# Patient Record
Sex: FEMALE | Race: WHITE | Employment: OTHER | URBAN - METROPOLITAN AREA
[De-identification: names, ages, dates, MRNs, and addresses within clinical notes are randomized per-mention and may not be internally consistent; named-entity substitution may affect disease eponyms.]

---

## 2018-07-25 ENCOUNTER — RADIANT APPOINTMENT (OUTPATIENT)
Dept: GENERAL RADIOLOGY | Facility: CLINIC | Age: 38
End: 2018-07-25
Attending: ORTHOPAEDIC SURGERY
Payer: COMMERCIAL

## 2018-07-25 ENCOUNTER — OFFICE VISIT (OUTPATIENT)
Dept: ORTHOPEDICS | Facility: CLINIC | Age: 38
End: 2018-07-25
Payer: COMMERCIAL

## 2018-07-25 VITALS
WEIGHT: 116 LBS | BODY MASS INDEX: 21.9 KG/M2 | HEIGHT: 61 IN | SYSTOLIC BLOOD PRESSURE: 118 MMHG | DIASTOLIC BLOOD PRESSURE: 76 MMHG

## 2018-07-25 DIAGNOSIS — M25.561 RIGHT KNEE PAIN, UNSPECIFIED CHRONICITY: ICD-10-CM

## 2018-07-25 DIAGNOSIS — M23.91 LOCKED KNEE, RIGHT: ICD-10-CM

## 2018-07-25 DIAGNOSIS — S83.241A TEAR OF MEDIAL MENISCUS OF RIGHT KNEE, CURRENT, UNSPECIFIED TEAR TYPE, INITIAL ENCOUNTER: Primary | ICD-10-CM

## 2018-07-25 DIAGNOSIS — M25.562 LEFT KNEE PAIN: ICD-10-CM

## 2018-07-25 DIAGNOSIS — M25.661 KNEE STIFFNESS, RIGHT: ICD-10-CM

## 2018-07-25 PROCEDURE — 73562 X-RAY EXAM OF KNEE 3: CPT | Mod: TC

## 2018-07-25 PROCEDURE — 99204 OFFICE O/P NEW MOD 45 MIN: CPT | Mod: 25 | Performed by: ORTHOPAEDIC SURGERY

## 2018-07-25 PROCEDURE — 20610 DRAIN/INJ JOINT/BURSA W/O US: CPT | Mod: RT | Performed by: ORTHOPAEDIC SURGERY

## 2018-07-25 RX ORDER — MELOXICAM 15 MG/1
15 TABLET ORAL DAILY
Qty: 60 TABLET | Refills: 1 | Status: SHIPPED | OUTPATIENT
Start: 2018-07-25 | End: 2021-09-23

## 2018-07-25 ASSESSMENT — PAIN SCALES - GENERAL: PAINLEVEL: SEVERE PAIN (6)
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
## 2018-07-25 NOTE — MR AVS SNAPSHOT
After Visit Summary   7/25/2018    Cristian Panda    MRN: [id number]           Patient Information     Date Of Birth          1980        Visit Information        Provider Department      7/25/2018 3:30 PM Miracle Mckeon MD [facility]        Today's Diagnoses     possible Tear of medial meniscus of right knee, current, unspecified tear type, initial encounter    -  1    Knee stiffness, right        Locked knee, right          Care Instructions    Encounter Diagnoses   Name Primary?     Knee stiffness, right      Locked knee, right      possible Tear of medial meniscus of right knee, current, unspecified tear type, initial encounter Yes     Rest, ice and elevate above heart level as needed for pain control  1. Xray: You have excellent looking x-rays.  No arthritis no fracture no tumor.  However x-rays do not show us the ligaments or tendons or soft tissue.  2.  On exam your knee is swollen and locked up.  You do not have much range of motion at all.  You are very tender on your medial side or inside part of your knee.  3. We have ordered an MRI for you.  Please call [phone] to schedule your MRI.  You will also need to schedule a follow up visit for the results from your MRI with Dr. Mckeon.  You may call us at [phone] to schedule this appointment.  Please make this appointment for at least  2-3 days after your MRI.   4.  The MRI will help tell us if you have a torn meniscus that is in the joint blocking your range of motion or may be a foreign body.  5.  Today we decided to do a few treatments to give you some relief until we see you back.  6. I ordered Mobic 15mg once a day times 2 weeks, then take as needed.  Stop this medication if you have any abdominal pain with it and do not take NSAIDs like Ibuprofen or Aleve while on this medication.  I sent this to your pharmacy.        7. We decided to do a cortisone injection today.  This is like putting very powerful  ibuprofen right to the area it is needed.  We have information about the injection below.   8. Follow up with Miracle Mckeon MD 2 to 3 days after the MRI is done to go over the results.   Cortisone Instructions:     1. You received an injection of cortisone into your right knee today.  2. The joint(s) may be more painful for the first 1-2 days.  3. We ask you to continue to rest the joint(s) for a few more days before resuming regular activities.  4. Pain Medications you can take (as long as your primary care provider allows these meds and you do not have kidney or liver conditions):  Tylenol  Take 1000 mg by mouth every 6 hours as needed; maximum dose 4000 mg a day  Ibuprofen  600 mg every 6 hours as needed; maximum 2400 mg a day  (OK to take tylenol and ibuprofen at the same time)  5. Rest, ice and elevate as needed for pain control  6. Watch for these signs of infection: redness, swelling, drainage, warmth to touch, increased pain, or fever. Call the clinic or make an appointment to be seen if you think you have an infection.  7. If you are diabetic, make sure you keep a close eye on your blood sugars, they can get elevated with cortisone injections.   8. Sometimes it can take 1-2 weeks for it to reach its full effect.    Cortisone Injections  Cortisone is a type of steroid. It can greatly reduce swelling, redness, and irritation (inflammation) and pain. Being injected with cortisone is simple and doesn t take long. Your doctor may ask you questions about your health. Certain health conditions, such as diabetes, can be affected by cortisone.     Your pain may be relieved by a cortisone injection.   Why have a cortisone injection?  Injecting cortisone can relieve pain for anything from a sports injury to arthritis. Your doctor may suggest an injection if rest, splints, or oral medicine doesn t relieve your pain. Injecting cortisone is simpler than having surgery. And cortisone may provide the lasting pain  relief that can help you get out and enjoy life again.  Getting the injection  Your doctor will start by cleaning and occasionally numbing your skin at the injection site. Next you ll be injected with local anesthetics (for short-term pain relief) and cortisone. The injection may last a few moments. A small bandage will be put over the injection site. You ll then be ready to go home.  After your injection  After being injected, make sure you don t injure the treated region. But stay active. Enjoy a walk or some other mild activity. Just be careful not to strain the region that gave you trouble.  The next day  Some people feel more pain after being injected. This is normal, and it will go away soon. Applying ice for 20 minutes at a time to your injury may reduce the increased pain. Rest for the first day or two. You don t need to stay in bed. But avoid tasks that may strain the injured region.  If you have diabetes  Cortisone injections can cause blood sugar to be increased for several days after the injection. If you have diabetes, you should follow your blood  sugar closely during this time. Follow your regular plan for what to do when your blood sugar is elevated.     0971-7306 [employer]. [address] [place] [address]. All rights reserved. This information is not intended as a substitute for professional medical care. Always follow your healthcare professional's instructions.     [employer] and [employer] may offer reliable information regarding your diagnosis and treatment plan.    THANK YOU for coming in today. If you receive a survey via [employer] or mail please let us know if there was anything you especially appreciated today or if there is any way we can improve our clinic. We appreciate your input.    GENERAL INFORMATION:  Our hours are:  Monday :     Clinic 7:30 AM-430 PM ([facility])  Tuesday:      Operating Room All Day ([facility])  Wednesday: Clinic 7:30 AM - 11:15 AM ([facility])             Clinic 1:00 PM - 4:00PM ([facility])  Thursday:     Administrative Day  Friday:          Clinic 7:30 AM - 11:15 AM ([facility])            Clinic 1:00 PM - 4:00 PM ([facility])      [facility] for any issues or concerns: [phone]      We are not in the office Thursdays. Therefore non- urgent calls and medical messages received on Thursday will be addressed when we are back in the office on Wednesday. Urgent matters will be reviewed and addressed by one of our partners in the office as needed.    If lab work was done today as part of your evaluation you will generally be contacted via [employer], mail, or phone with the results within 1-5 days. If there is an alarming result we will contact you by phone. Lab results come back at varying times, I generally wait until all labs are resulted before making comments on results. Please note labs are automatically released to [employer] (if you have signed up for it) once available-at times you may see these prior to my having a chance to review them as well.    If you need refills please contact your pharmacist. They will send a refill request to me to review. Please allow 3 business days for us to process all refill requests. All narcotic refills should be handled in the clinic at the time of your visit.           Follow-ups after your visit        Who to contact     If you have questions or need follow up information about today's clinic visit or your schedule please contact [facility] directly at [phone].  Normal or non-critical lab and imaging results will be communicated to you by MyChart, letter or phone within 4 business days after the clinic has received the results. If you do not hear from us within 7 days, please contact the clinic through [employer] or phone. If you have a critical or abnormal  "lab result, we will notify you by phone as soon as possible.  Submit refill requests through [employer] or call your pharmacy and they will forward the refill request to us. Please allow 3 business days for your refill to be completed.          Additional Information About Your Visit        [employer] Information     [employer] lets you send messages to your doctor, view your test results, renew your prescriptions, schedule appointments and more. To sign up, go to [email or web address] . Click on \"Log in\" on the left side of the screen, which will take you to the Welcome page. Then click on \"Sign up Now\" on the right side of the page.     You will be asked to enter the access code listed below, as well as some personal information. Please follow the directions to create your username and password.     Your access code is: [email or web address]  Expires: 10/23/2018  4:33 PM     Your access code will  in 90 days. If you need help or a new code, please call your [place] clinic or [phone].        Care EveryWhere ID     This is your Care EveryWhere ID. This could be used by other organizations to access your [place] medical records  [id number]        Your Vitals Were     Height BMI (Body Mass Index)                1.549 m (5' 1\") 21.92 kg/m2           Blood Pressure from Last 3 Encounters:   18 118/76   16 120/70   16 114/66    Weight from Last 3 Encounters:   18 52.6 kg (116 lb)   16 52.2 kg (115 lb)   16 52.2 kg (115 lb)              Today, you had the following     No orders found for display         Today's Medication Changes          These changes are accurate as of 18  4:33 PM.  If you have any questions, ask your nurse or doctor.               Start taking these medicines.        Dose/Directions    meloxicam 15 MG tablet   Commonly known as:  MOBIC   Used for:  Knee stiffness, right, Locked knee, right, Tear of medial meniscus of right knee, current, unspecified tear type, " initial encounter   Started by:  Miracle Mckeon MD        Dose:  15 mg   Take 1 tablet (15 mg) by mouth daily   Quantity:  60 tablet   Refills:  1            Where to get your medicines      These medications were sent to [facility] [place] - [address] [facility]   [address] [facility] , [facility] [address]     Phone:  [phone]     meloxicam 15 MG tablet                Primary Care Provider Office Phone # Fax #    [facility] [phone]       [address] [place] [address] [address]  [facility] [address]        Equal Access to Services     [facility]: Hadii aad ku hadasho Soomaali, waaxda luqadaha, qaybta kaalmada adeegyada, waxivet housein hayaan adenila logan . So [facility] [phone].    ATENCIÓN: Si habla español, tiene a jackson disposición servicios gratuitos de asistencia lingüística. [facility] [phone].    We comply with applicable federal civil rights laws and [place] laws. We do not discriminate on the basis of race, color, national origin, age, disability, sex, sexual orientation, or gender identity.            Thank you!     Thank you for choosing [facility]  for your care. Our goal is always to provide you with excellent care. Hearing back from our patients is one way we can continue to improve our services. Please take a few minutes to complete the written survey that you may receive in the mail after your visit with us. Thank you!             Your Updated Medication List - Protect others around you: Learn how to safely use, store and throw away your medicines at www.disposemymeds.org.          This list is accurate as of 7/25/18  4:33 PM.  Always use your most recent med list.                   Brand Name Dispense Instructions for use Diagnosis    levonorgestrel 20 MCG/24HR IUD    MIRENA     1 each (20 mcg) by Intrauterine route continuous    Encounter for IUD insertion, Encounter for IUD removal       meloxicam 15 MG tablet    MOBIC    60  tablet    Take 1 tablet (15 mg) by mouth daily    Knee stiffness, right, Locked knee, right, Tear of medial meniscus of right knee, current, unspecified tear type, initial encounter       neomycin-polymixin-dexamethasone ophthalmic ointment    MAXITROL    3.5 g    Instil 1/2 inch ribbon of ointment into affected eye every 4 hours for 7 to 10 days.    Redness or discharge of eye, Acute bacterial conjunctivitis of both eyes       priLOSEC 20 MG CR capsule   Generic drug:  omeprazole      1 CAPSULE DAILY

## 2018-07-25 NOTE — PATIENT INSTRUCTIONS
Encounter Diagnoses   Name Primary?     Knee stiffness, right      Locked knee, right      possible Tear of medial meniscus of right knee, current, unspecified tear type, initial encounter Yes     Rest, ice and elevate above heart level as needed for pain control  1. Xray: You have excellent looking x-rays.  No arthritis no fracture no tumor.  However x-rays do not show us the ligaments or tendons or soft tissue.  2.  On exam your knee is swollen and locked up.  You do not have much range of motion at all.  You are very tender on your medial side or inside part of your knee.  3. We have ordered an MRI for you.  Please call [phone] to schedule your MRI.  You will also need to schedule a follow up visit for the results from your MRI with Dr. Mckeon.  You may call us at [phone] to schedule this appointment.  Please make this appointment for at least  2-3 days after your MRI.   4.  The MRI will help tell us if you have a torn meniscus that is in the joint blocking your range of motion or may be a foreign body.  5.  Today we decided to do a few treatments to give you some relief until we see you back.  6. I ordered Mobic 15mg once a day times 2 weeks, then take as needed.  Stop this medication if you have any abdominal pain with it and do not take NSAIDs like Ibuprofen or Aleve while on this medication.  I sent this to your pharmacy.        7. We decided to do a cortisone injection today.  This is like putting very powerful ibuprofen right to the area it is needed.  We have information about the injection below.   8. Follow up with Miracle Mckeon MD 2 to 3 days after the MRI is done to go over the results.   Cortisone Instructions:     1. You received an injection of cortisone into your right knee today.  2. The joint(s) may be more painful for the first 1-2 days.  3. We ask you to continue to rest the joint(s) for a few more days before resuming regular activities.  4. Pain Medications you can take (as  long as your primary care provider allows these meds and you do not have kidney or liver conditions):  Tylenol  Take 1000 mg by mouth every 6 hours as needed; maximum dose 4000 mg a day  Ibuprofen  600 mg every 6 hours as needed; maximum 2400 mg a day  (OK to take tylenol and ibuprofen at the same time)  5. Rest, ice and elevate as needed for pain control  6. Watch for these signs of infection: redness, swelling, drainage, warmth to touch, increased pain, or fever. Call the clinic or make an appointment to be seen if you think you have an infection.  7. If you are diabetic, make sure you keep a close eye on your blood sugars, they can get elevated with cortisone injections.   8. Sometimes it can take 1-2 weeks for it to reach its full effect.    Cortisone Injections  Cortisone is a type of steroid. It can greatly reduce swelling, redness, and irritation (inflammation) and pain. Being injected with cortisone is simple and doesn t take long. Your doctor may ask you questions about your health. Certain health conditions, such as diabetes, can be affected by cortisone.     Your pain may be relieved by a cortisone injection.   Why have a cortisone injection?  Injecting cortisone can relieve pain for anything from a sports injury to arthritis. Your doctor may suggest an injection if rest, splints, or oral medicine doesn t relieve your pain. Injecting cortisone is simpler than having surgery. And cortisone may provide the lasting pain relief that can help you get out and enjoy life again.  Getting the injection  Your doctor will start by cleaning and occasionally numbing your skin at the injection site. Next you ll be injected with local anesthetics (for short-term pain relief) and cortisone. The injection may last a few moments. A small bandage will be put over the injection site. You ll then be ready to go home.  After your injection  After being injected, make sure you don t injure the treated region. But stay active.  Enjoy a walk or some other mild activity. Just be careful not to strain the region that gave you trouble.  The next day  Some people feel more pain after being injected. This is normal, and it will go away soon. Applying ice for 20 minutes at a time to your injury may reduce the increased pain. Rest for the first day or two. You don t need to stay in bed. But avoid tasks that may strain the injured region.  If you have diabetes  Cortisone injections can cause blood sugar to be increased for several days after the injection. If you have diabetes, you should follow your blood  sugar closely during this time. Follow your regular plan for what to do when your blood sugar is elevated.     0569-1030 The [employer]. [address] [place] [address], [place] [address]. All rights reserved. This information is not intended as a substitute for professional medical care. Always follow your healthcare professional's instructions.     [email or web address] and [employer] may offer reliable information regarding your diagnosis and treatment plan.    THANK YOU for coming in today. If you receive a survey via [employer] or mail please let us know if there was anything you especially appreciated today or if there is any way we can improve our clinic. We appreciate your input.    GENERAL INFORMATION:  Our hours are:  Monday :     Clinic 7:30 AM-430 PM ([facility])  Tuesday:      Operating Room All Day ([facility])  Wednesday: Clinic 7:30 AM - 11:15 AM ([facility])             Clinic 1:00 PM - 4:00PM ([facility])  Thursday:     Administrative Day  Friday:          Clinic 7:30 AM - 11:15 AM ([facility])            Clinic 1:00 PM - 4:00 PM ([facility])      [facility] for any issues or concerns: [phone]      We are not in the office Thursdays. Therefore non- urgent calls and medical messages received on Thursday will be  addressed when we are back in the office on Wednesday. Urgent matters will be reviewed and addressed by one of our partners in the office as needed.    If lab work was done today as part of your evaluation you will generally be contacted via [employer], mail, or phone with the results within 1-5 days. If there is an alarming result we will contact you by phone. Lab results come back at varying times, I generally wait until all labs are resulted before making comments on results. Please note labs are automatically released to [employer] (if you have signed up for it) once available-at times you may see these prior to my having a chance to review them as well.    If you need refills please contact your pharmacist. They will send a refill request to me to review. Please allow 3 business days for us to process all refill requests. All narcotic refills should be handled in the clinic at the time of your visit.

## 2018-07-26 ENCOUNTER — HOSPITAL ENCOUNTER (OUTPATIENT)
Dept: MRI IMAGING | Facility: CLINIC | Age: 38
Discharge: HOME | End: 2018-07-26
Attending: PHYSICIAN ASSISTANT | Admitting: PHYSICIAN ASSISTANT
Payer: COMMERCIAL

## 2018-07-26 DIAGNOSIS — M23.91 LOCKED KNEE, RIGHT: ICD-10-CM

## 2018-07-26 DIAGNOSIS — S83.241A TEAR OF MEDIAL MENISCUS OF RIGHT KNEE, CURRENT, UNSPECIFIED TEAR TYPE, INITIAL ENCOUNTER: ICD-10-CM

## 2018-07-26 PROCEDURE — 73721 MRI JNT OF LWR EXTRE W/O DYE: CPT | Mod: RT

## 2018-08-03 ENCOUNTER — TELEPHONE (OUTPATIENT)
Dept: ORTHOPEDICS | Facility: CLINIC | Age: 38
End: 2018-08-03

## 2018-08-08 ENCOUNTER — OFFICE VISIT (OUTPATIENT)
Dept: ORTHOPEDICS | Facility: CLINIC | Age: 38
End: 2018-08-08
Payer: COMMERCIAL

## 2018-08-08 VITALS
BODY MASS INDEX: 21.52 KG/M2 | HEIGHT: 61 IN | SYSTOLIC BLOOD PRESSURE: 120 MMHG | WEIGHT: 114 LBS | DIASTOLIC BLOOD PRESSURE: 72 MMHG

## 2018-08-08 DIAGNOSIS — M25.661 KNEE STIFFNESS, RIGHT: Primary | ICD-10-CM

## 2018-08-08 PROCEDURE — 99212 OFFICE O/P EST SF 10 MIN: CPT | Performed by: ORTHOPAEDIC SURGERY

## 2018-08-08 ASSESSMENT — PAIN SCALES - GENERAL: PAINLEVEL: NO PAIN (0)

## 2018-08-08 NOTE — PROGRESS NOTES
"Office Visit-Follow up      Chief Complaint: Cristian Panda is a 37 year old female who is being seen for   Chief Complaint   Patient presents with     Results     rt knee MRI results         History of Present Illness:   Injection helped a lot, feels 98% better      Past medical history reviewed and there is no significant change    Patient does use Tobacco products. Patient not ready to quit at this time.  Strongly encouraged smoking cessation.  Risks of smoking and benefits of quitting were discussed.  Information offered: AVS with information about stopping smoking and advised to discuss smoking cessation medications/strategies with Primary care provider.  3-5 Minutes were spent in counseling.    REVIEW OF SYSTEMS  General: negative for, night sweats, dizziness, fatigue  Resp: No shortness of breath and no cough  CV: negative for chest pain, syncope or near-syncope  GI: negative for nausea, vomiting and diarrhea  : negative for dysuria and hematuria  Musculoskeletal: as above  Neurologic: negative for syncope   Hematologic: negative for bleeding disorder      Physical Exam:  Vitals: /72  Ht 1.549 m (5' 1\")  Wt 51.7 kg (114 lb)  BMI 21.54 kg/m2  BMI= Body mass index is 21.54 kg/(m^2).  Constitutional: healthy, alert and no acute distress   Psychiatric: mentation appears normal and affect normal/bright  NEURO: no focal deficits  RESP: Normal with easy respirations and no use of accessory muscles to breathe, no audible wheezing or retractions  CV: No peripheral edema  SKIN: No erythema, rashes, excoriation, or breakdown. No evidence of infection.   JOINT/EXTREMITIES:right Knee Exam: Inspection: AP/lateral alignment normal, No effusion, No quad atrophy  Tender: none  Non-tender: lateral joint line, medial joint line  Active Range of Motion: all normal, much improved  Strength: normal  Special tests: normal Valgus stress test, normal Varus, negative Lachman's test    GAIT: non-antalgic      Diagnostic " Modalities:  right knee MRI:  medial meniscus tear. effusion. Both small.  Independent visualization of the images was performed.      Impression: right Medial meniscus tear  Effusion, stiffness, resolved    Plan:  All of the above pertinent physical exam and imaging modalities findings was reviewed with Cristian.                                          CONSERVATIVE CARE:  I recommend conservative care for the patient to include NSAIDs, Tylenol, focused self directed physical therapy, steroid injections, activity modifications. .    BP Readings from Last 1 Encounters:   08/08/18 120/72       BP noted to be well controlled today in office.     Return to clinic 1, month(s), PRN, or sooner as needed for changes.  Re-x-ray on return: No    Miracle Mckeon M.D.

## 2018-08-08 NOTE — MR AVS SNAPSHOT
"              After Visit Summary   2018    Cristian Panda    MRN: [id number]           Patient Information     Date Of Birth          1980        Visit Information        Provider Department      2018 3:20 PM Miracle Mckeon MD [facility]        Today's Diagnoses     Knee stiffness, right    -  1       Follow-ups after your visit        Who to contact     If you have questions or need follow up information about today's clinic visit or your schedule please contact [facility] directly at [phone].  Normal or non-critical lab and imaging results will be communicated to you by [employer], letter or phone within 4 business days after the clinic has received the results. If you do not hear from us within 7 days, please contact the clinic through [employer] or phone. If you have a critical or abnormal lab result, we will notify you by phone as soon as possible.  Submit refill requests through [employer] or call your pharmacy and they will forward the refill request to us. Please allow 3 business days for your refill to be completed.          Additional Information About Your Visit        MyChart Information     [employer] lets you send messages to your doctor, view your test results, renew your prescriptions, schedule appointments and more. To sign up, go to [email or web address] . Click on \"Log in\" on the left side of the screen, which will take you to the Welcome page. Then click on \"Sign up Now\" on the right side of the page.     You will be asked to enter the access code listed below, as well as some personal information. Please follow the directions to create your username and password.     Your access code is: [email or web address]  Expires: 10/23/2018  4:33 PM     Your access code will  in 90 days. If you need help or a new code, please call your [facility] or [phone].        Care EveryWhere ID     This is your Care EveryWhere ID. This could be used by other " "organizations to access your [place] medical records  [id number]        Your Vitals Were     Height BMI (Body Mass Index)                1.549 m (5' 1\") 21.54 kg/m2           Blood Pressure from Last 3 Encounters:   08/08/18 120/72   07/25/18 118/76   09/27/16 120/70    Weight from Last 3 Encounters:   08/08/18 51.7 kg (114 lb)   07/25/18 52.6 kg (116 lb)   09/27/16 52.2 kg (115 lb)              Today, you had the following     No orders found for display       Primary Care Provider Office Phone # Fax #    [place] [phone] [address] [facility] [address]        Equal Access to Services     HINA GODINEZ : Ty lion Sofrederic, waaxda luqadaha, qaybta kaalmada adeegyada, amilcar wooten. So [facility] [phone].    ATENCIÓN: Si habla español, tiene a jackson disposición servicios gratuitos de asistencia lingüística. Llame al [phone].    We comply with applicable federal civil rights laws and [place] laws. We do not discriminate on the basis of race, color, national origin, age, disability, sex, sexual orientation, or gender identity.            Thank you!     Thank you for choosing [facility]  for your care. Our goal is always to provide you with excellent care. Hearing back from our patients is one way we can continue to improve our services. Please take a few minutes to complete the written survey that you may receive in the mail after your visit with us. Thank you!             Your Updated Medication List - Protect others around you: Learn how to safely use, store and throw away your medicines at www.disposemymeds.org.          This list is accurate as of 8/8/18  4:56 PM.  Always use your most recent med list.                   Brand Name Dispense Instructions for use Diagnosis    levonorgestrel 20 MCG/24HR IUD    MIRENA     1 each (20 mcg) by Intrauterine route continuous    Encounter for IUD insertion, Encounter " for IUD removal       meloxicam 15 MG tablet    MOBIC    60 tablet    Take 1 tablet (15 mg) by mouth daily    Knee stiffness, right, Locked knee, right, Tear of medial meniscus of right knee, current, unspecified tear type, initial encounter       neomycin-polymixin-dexamethasone ophthalmic ointment    MAXITROL    3.5 g    Instil 1/2 inch ribbon of ointment into affected eye every 4 hours for 7 to 10 days.    Redness or discharge of eye, Acute bacterial conjunctivitis of both eyes       priLOSEC 20 MG CR capsule   Generic drug:  omeprazole      1 CAPSULE DAILY

## 2018-08-08 NOTE — LETTER
"    8/8/2018         RE: Cristian Panda  [address]  [facility] [address]        Dear Colleague,    Thank you for referring your patient, Cristian Panda, to the [facility]. Please see a copy of my visit note below.    Office Visit-Follow up      Chief Complaint: Cristian Panda is a 37 year old female who is being seen for   Chief Complaint   Patient presents with     Results     rt knee MRI results         History of Present Illness:   Injection helped a lot, feels 98% better      Past medical history reviewed and there is no significant change    Patient does use Tobacco products. Patient not ready to quit at this time.  Strongly encouraged smoking cessation.  Risks of smoking and benefits of quitting were discussed.  Information offered: AVS with information about stopping smoking and advised to discuss smoking cessation medications/strategies with Primary care provider.  3-5 Minutes were spent in counseling.    REVIEW OF SYSTEMS  General: negative for, night sweats, dizziness, fatigue  Resp: No shortness of breath and no cough  CV: negative for chest pain, syncope or near-syncope  GI: negative for nausea, vomiting and diarrhea  : negative for dysuria and hematuria  Musculoskeletal: as above  Neurologic: negative for syncope   Hematologic: negative for bleeding disorder      Physical Exam:  Vitals: /72  Ht 1.549 m (5' 1\")  Wt 51.7 kg (114 lb)  BMI 21.54 kg/m2  BMI= Body mass index is 21.54 kg/(m^2).  Constitutional: healthy, alert and no acute distress   Psychiatric: mentation appears normal and affect normal/bright  NEURO: no focal deficits  RESP: Normal with easy respirations and no use of accessory muscles to breathe, no audible wheezing or retractions  CV: No peripheral edema  SKIN: No erythema, rashes, excoriation, or breakdown. No evidence of infection.   JOINT/EXTREMITIES:right Knee Exam: Inspection: AP/lateral alignment normal, No effusion, No quad atrophy  Tender: " none  Non-tender: lateral joint line, medial joint line  Active Range of Motion: all normal, much improved  Strength: normal  Special tests: normal Valgus stress test, normal Varus, negative Lachman's test    GAIT: non-antalgic      Diagnostic Modalities:  right knee MRI:  medial meniscus tear. effusion. Both small.  Independent visualization of the images was performed.      Impression: right Medial meniscus tear  Effusion, stiffness, resolved    Plan:  All of the above pertinent physical exam and imaging modalities findings was reviewed with Cristian.                                          CONSERVATIVE CARE:  I recommend conservative care for the patient to include NSAIDs, Tylenol, focused self directed physical therapy, steroid injections, activity modifications. .    BP Readings from Last 1 Encounters:   08/08/18 120/72       BP noted to be well controlled today in office.     Return to clinic 1, month(s), PRN, or sooner as needed for changes.  Re-x-ray on return: No    Miracle Mckeon M.D.          Again, thank you for allowing me to participate in the care of your patient.        Sincerely,        Miracle Mckeon MD

## 2019-07-10 ENCOUNTER — HOSPITAL ENCOUNTER (EMERGENCY)
Facility: CLINIC | Age: 39
Discharge: HOME | End: 2019-07-11
Attending: FAMILY MEDICINE | Admitting: FAMILY MEDICINE
Payer: COMMERCIAL

## 2019-07-10 DIAGNOSIS — M54.5 ACUTE LEFT-SIDED LOW BACK PAIN, WITH SCIATICA PRESENCE UNSPECIFIED: ICD-10-CM

## 2019-07-10 LAB
ALBUMIN UR-MCNC: NEGATIVE MG/DL
APPEARANCE UR: CLEAR
BASOPHILS # BLD AUTO: 0.1 10E9/L (ref 0–0.2)
BASOPHILS NFR BLD AUTO: 0.7 %
BILIRUB UR QL STRIP: NEGATIVE
COLOR UR AUTO: YELLOW
DIFFERENTIAL METHOD BLD: NORMAL
EOSINOPHIL NFR BLD AUTO: 2.1 %
ERYTHROCYTE [DISTWIDTH] IN BLOOD BY AUTOMATED COUNT: 12 % (ref 10–15)
GLUCOSE UR STRIP-MCNC: NEGATIVE MG/DL
HCG UR QL: NEGATIVE
HCT VFR BLD AUTO: 37.2 % (ref 35–47)
HGB BLD-MCNC: 12.5 G/DL (ref 11.7–15.7)
HGB UR QL STRIP: NEGATIVE
IMM GRANULOCYTES # BLD: 0 10E9/L (ref 0–0.4)
IMM GRANULOCYTES NFR BLD: 0.3 %
KETONES UR STRIP-MCNC: 5 MG/DL
LEUKOCYTE ESTERASE UR QL STRIP: NEGATIVE
LYMPHOCYTES # BLD AUTO: 3.8 10E9/L (ref 0.8–5.3)
LYMPHOCYTES NFR BLD AUTO: 43.4 %
MCH RBC QN AUTO: 28.2 PG (ref 26.5–33)
MCHC RBC AUTO-ENTMCNC: 33.6 G/DL (ref 31.5–36.5)
MCV RBC AUTO: 84 FL (ref 78–100)
MONOCYTES # BLD AUTO: 0.6 10E9/L (ref 0–1.3)
MONOCYTES NFR BLD AUTO: 7.2 %
MUCOUS THREADS #/AREA URNS LPF: PRESENT /LPF
NEUTROPHILS # BLD AUTO: 4.1 10E9/L (ref 1.6–8.3)
NEUTROPHILS NFR BLD AUTO: 46.3 %
NITRATE UR QL: NEGATIVE
NRBC # BLD AUTO: 0 10*3/UL
NRBC BLD AUTO-RTO: 0 /100
PH UR STRIP: 5 PH (ref 5–7)
PLATELET # BLD AUTO: 292 10E9/L (ref 150–450)
RBC # BLD AUTO: 4.43 10E12/L (ref 3.8–5.2)
RBC #/AREA URNS AUTO: <1 /HPF (ref 0–2)
SOURCE: ABNORMAL
SP GR UR STRIP: 1.03 (ref 1–1.03)
SQUAMOUS #/AREA URNS AUTO: <1 /HPF (ref 0–1)
UROBILINOGEN UR STRIP-MCNC: 0 MG/DL (ref 0–2)
WBC # BLD AUTO: 8.8 10E9/L (ref 4–11)
WBC #/AREA URNS AUTO: 1 /HPF (ref 0–5)

## 2019-07-10 PROCEDURE — 96375 TX/PRO/DX INJ NEW DRUG ADDON: CPT | Performed by: FAMILY MEDICINE

## 2019-07-10 PROCEDURE — 81025 URINE PREGNANCY TEST: CPT | Performed by: FAMILY MEDICINE

## 2019-07-10 PROCEDURE — 99285 EMERGENCY DEPT VISIT HI MDM: CPT | Mod: 25 | Performed by: FAMILY MEDICINE

## 2019-07-10 PROCEDURE — 96374 THER/PROPH/DIAG INJ IV PUSH: CPT | Performed by: FAMILY MEDICINE

## 2019-07-10 PROCEDURE — 80048 BASIC METABOLIC PNL TOTAL CA: CPT | Performed by: FAMILY MEDICINE

## 2019-07-10 PROCEDURE — 99284 EMERGENCY DEPT VISIT MOD MDM: CPT | Mod: Z6 | Performed by: FAMILY MEDICINE

## 2019-07-10 PROCEDURE — 80306 DRUG TEST PRSMV INSTRMNT: CPT | Performed by: FAMILY MEDICINE

## 2019-07-10 PROCEDURE — 81001 URINALYSIS AUTO W/SCOPE: CPT | Performed by: FAMILY MEDICINE

## 2019-07-10 PROCEDURE — 85025 COMPLETE CBC W/AUTO DIFF WBC: CPT | Performed by: FAMILY MEDICINE

## 2019-07-10 PROCEDURE — 25000128 H RX IP 250 OP 636: Performed by: FAMILY MEDICINE

## 2019-07-10 RX ORDER — DEXAMETHASONE SODIUM PHOSPHATE 10 MG/ML
10 INJECTION, SOLUTION INTRAMUSCULAR; INTRAVENOUS ONCE
Status: COMPLETED | OUTPATIENT
Start: 2019-07-10 | End: 2019-07-10

## 2019-07-10 RX ORDER — KETOROLAC TROMETHAMINE 30 MG/ML
30 INJECTION, SOLUTION INTRAMUSCULAR; INTRAVENOUS ONCE
Status: COMPLETED | OUTPATIENT
Start: 2019-07-10 | End: 2019-07-10

## 2019-07-10 RX ORDER — LORAZEPAM 2 MG/ML
1 INJECTION INTRAMUSCULAR ONCE
Status: COMPLETED | OUTPATIENT
Start: 2019-07-10 | End: 2019-07-10

## 2019-07-10 RX ADMIN — LORAZEPAM 1 MG: 2 INJECTION INTRAMUSCULAR; INTRAVENOUS at 23:37

## 2019-07-10 RX ADMIN — KETOROLAC TROMETHAMINE 30 MG: 30 INJECTION, SOLUTION INTRAMUSCULAR at 23:39

## 2019-07-10 RX ADMIN — DEXAMETHASONE SODIUM PHOSPHATE 10 MG: 10 INJECTION, SOLUTION INTRAMUSCULAR; INTRAVENOUS at 23:40

## 2019-07-11 VITALS
HEART RATE: 84 BPM | DIASTOLIC BLOOD PRESSURE: 81 MMHG | RESPIRATION RATE: 14 BRPM | TEMPERATURE: 97 F | SYSTOLIC BLOOD PRESSURE: 108 MMHG | OXYGEN SATURATION: 98 %

## 2019-07-11 LAB
AMPHETAMINES UR QL: NOT DETECTED NG/ML
ANION GAP SERPL CALCULATED.3IONS-SCNC: 8 MMOL/L (ref 3–14)
BARBITURATES UR QL SCN: NOT DETECTED NG/ML
BENZODIAZ UR QL SCN: NOT DETECTED NG/ML
BUN SERPL-MCNC: 16 MG/DL (ref 7–30)
BUPRENORPHINE UR QL: NOT DETECTED NG/ML
CALCIUM SERPL-MCNC: 8.8 MG/DL (ref 8.5–10.1)
CANNABINOIDS UR QL: ABNORMAL NG/ML
CHLORIDE SERPL-SCNC: 105 MMOL/L (ref 94–109)
CO2 SERPL-SCNC: 28 MMOL/L (ref 20–32)
COCAINE UR QL SCN: NOT DETECTED NG/ML
CREAT SERPL-MCNC: 0.82 MG/DL (ref 0.52–1.04)
D-METHAMPHET UR QL: NOT DETECTED NG/ML
GFR SERPL CREATININE-BSD FRML MDRD: 90 ML/MIN/{1.73_M2}
GLUCOSE SERPL-MCNC: 94 MG/DL (ref 70–99)
METHADONE UR QL SCN: ABNORMAL NG/ML
OPIATES UR QL SCN: NOT DETECTED NG/ML
OXYCODONE UR QL SCN: NOT DETECTED NG/ML
PCP UR QL SCN: NOT DETECTED NG/ML
POTASSIUM SERPL-SCNC: 3.8 MMOL/L (ref 3.4–5.3)
PROPOXYPH UR QL: NOT DETECTED NG/ML
SODIUM SERPL-SCNC: 141 MMOL/L (ref 133–144)
TRICYCLICS UR QL SCN: NOT DETECTED NG/ML

## 2019-07-11 PROCEDURE — 25000128 H RX IP 250 OP 636: Performed by: FAMILY MEDICINE

## 2019-07-11 RX ADMIN — HYDROMORPHONE HYDROCHLORIDE 1 MG: 1 INJECTION, SOLUTION INTRAMUSCULAR; INTRAVENOUS; SUBCUTANEOUS at 00:53

## 2019-07-11 NOTE — DISCHARGE INSTRUCTIONS
Ice 20 minutes per hour, (20 minutes on, 40 minutes off) at least 4 times a day.   You may alternate with or use heat if you find that more helpful.  Continue on your current medications including the methadone.  Recheck in clinic with your primary physician if persistent problems.  You may need some physical therapy or to see a spine specialist if your problems persist.  Return to the ED if you lose control of your bladder or bowels, fevers over 100.4, persistent pain not controlled by oral medications or any concerns.  Your lead work and urine tests looked good tonight.  It was nice visiting with both of you.  I am glad you are feeling at least a little bit better and hope you continue to improve so you can enjoy your weekend.    Thank you for choosing [facility]. We appreciate the opportunity to meet your urgent medical needs. Please let us know if we could have done anything to make your stay more satisfying.    After discharge, please closely monitor for any new or worsening symptoms. Return to the Emergency Department if you develop any acute worsening signs or symptoms.    If you received an opiate pain medication or sedative during your visit, please do not drive for at least 8 hours.     If you had lab work, cultures or imaging studies done during your stay, the final results may still be pending. We will call you if your plan of care needs to change. However, if you are not improving as expected, please follow up with your primary care provider or clinic.     Start any prescription medications that were prescribed to you and take them as directed.     Please see additional handouts that may be pertinent to your condition.
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
## 2019-07-15 ENCOUNTER — OFFICE VISIT (OUTPATIENT)
Dept: ORTHOPEDICS | Facility: OTHER | Age: 39
End: 2019-07-15
Payer: COMMERCIAL

## 2019-07-15 ENCOUNTER — ANCILLARY PROCEDURE (OUTPATIENT)
Dept: GENERAL RADIOLOGY | Facility: OTHER | Age: 39
End: 2019-07-15
Attending: PHYSICAL MEDICINE & REHABILITATION
Payer: COMMERCIAL

## 2019-07-15 VITALS
WEIGHT: 119.5 LBS | BODY MASS INDEX: 22.56 KG/M2 | HEIGHT: 61 IN | DIASTOLIC BLOOD PRESSURE: 80 MMHG | SYSTOLIC BLOOD PRESSURE: 116 MMHG

## 2019-07-15 DIAGNOSIS — G89.29 CHRONIC LEFT-SIDED LOW BACK PAIN WITH LEFT-SIDED SCIATICA: Primary | ICD-10-CM

## 2019-07-15 DIAGNOSIS — M54.42 CHRONIC LEFT-SIDED LOW BACK PAIN WITH LEFT-SIDED SCIATICA: Primary | ICD-10-CM

## 2019-07-15 DIAGNOSIS — M54.42 LEFT-SIDED LOW BACK PAIN WITH LEFT-SIDED SCIATICA: ICD-10-CM

## 2019-07-15 DIAGNOSIS — M79.10 MYALGIA: ICD-10-CM

## 2019-07-15 PROCEDURE — 99204 OFFICE O/P NEW MOD 45 MIN: CPT | Performed by: PHYSICAL MEDICINE & REHABILITATION

## 2019-07-15 PROCEDURE — 72100 X-RAY EXAM L-S SPINE 2/3 VWS: CPT

## 2019-07-15 RX ORDER — METHADONE HYDROCHLORIDE 10 MG/1
20 TABLET ORAL EVERY 8 HOURS
COMMUNITY

## 2019-07-15 RX ORDER — CYCLOBENZAPRINE HCL 5 MG
5 TABLET ORAL 3 TIMES DAILY PRN
Qty: 60 TABLET | Refills: 1 | Status: SHIPPED | OUTPATIENT
Start: 2019-07-15 | End: 2021-09-23

## 2019-07-15 RX ORDER — METHYLPREDNISOLONE 4 MG
TABLET, DOSE PACK ORAL
Qty: 21 TABLET | Refills: 0 | Status: SHIPPED | OUTPATIENT
Start: 2019-07-15 | End: 2021-09-23

## 2019-07-15 ASSESSMENT — MIFFLIN-ST. JEOR: SCORE: 1145.42
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
## 2019-07-15 NOTE — PATIENT INSTRUCTIONS
-MRI of the lumbar spine ordered.  [facility] [phone]. Cost estimates can be provided by [facility] at [phone].  Please do 5-6 days of exercises per week between formal sessions and the home exercises they provide.  -Medrol dose pack prescribed.    -Flexeril prescribed.  Take as needed as prescribed.  Can cause sedation.  Do not take prior to driving.  -Ice or heat 15-20 minutes as needed (Avoid sleeping on a heating pad or ice).  -Patient's preferred over the counter medication as directed on packaging as needed for pain or soreness.    -We also discussed trigger point injections    -We will call you with the results of the MRI.

## 2019-07-16 ENCOUNTER — HOSPITAL ENCOUNTER (EMERGENCY)
Facility: CLINIC | Age: 39
Discharge: HOME | End: 2019-07-16
Attending: FAMILY MEDICINE | Admitting: FAMILY MEDICINE
Payer: COMMERCIAL

## 2019-07-16 VITALS
DIASTOLIC BLOOD PRESSURE: 94 MMHG | TEMPERATURE: 98.9 F | HEART RATE: 72 BPM | WEIGHT: 120 LBS | RESPIRATION RATE: 20 BRPM | SYSTOLIC BLOOD PRESSURE: 131 MMHG | BODY MASS INDEX: 22.86 KG/M2 | OXYGEN SATURATION: 98 %

## 2019-07-16 DIAGNOSIS — M54.50 ACUTE EXACERBATION OF CHRONIC LOW BACK PAIN: ICD-10-CM

## 2019-07-16 DIAGNOSIS — G89.29 ACUTE EXACERBATION OF CHRONIC LOW BACK PAIN: ICD-10-CM

## 2019-07-16 PROCEDURE — 96375 TX/PRO/DX INJ NEW DRUG ADDON: CPT | Performed by: FAMILY MEDICINE

## 2019-07-16 PROCEDURE — 25000125 ZZHC RX 250: Performed by: FAMILY MEDICINE

## 2019-07-16 PROCEDURE — 99285 EMERGENCY DEPT VISIT HI MDM: CPT | Mod: Z6 | Performed by: FAMILY MEDICINE

## 2019-07-16 PROCEDURE — 25000128 H RX IP 250 OP 636: Performed by: FAMILY MEDICINE

## 2019-07-16 PROCEDURE — 96374 THER/PROPH/DIAG INJ IV PUSH: CPT | Performed by: FAMILY MEDICINE

## 2019-07-16 PROCEDURE — 25000131 ZZH RX MED GY IP 250 OP 636 PS 637: Performed by: FAMILY MEDICINE

## 2019-07-16 PROCEDURE — 99285 EMERGENCY DEPT VISIT HI MDM: CPT | Performed by: FAMILY MEDICINE

## 2019-07-16 RX ORDER — BUPIVACAINE HYDROCHLORIDE 5 MG/ML
5 INJECTION, SOLUTION EPIDURAL; INTRACAUDAL ONCE
Status: COMPLETED | OUTPATIENT
Start: 2019-07-16 | End: 2019-07-16

## 2019-07-16 RX ORDER — PREDNISONE 20 MG/1
60 TABLET ORAL ONCE
Status: COMPLETED | OUTPATIENT
Start: 2019-07-16 | End: 2019-07-16

## 2019-07-16 RX ORDER — KETOROLAC TROMETHAMINE 30 MG/ML
30 INJECTION, SOLUTION INTRAMUSCULAR; INTRAVENOUS ONCE
Status: COMPLETED | OUTPATIENT
Start: 2019-07-16 | End: 2019-07-16

## 2019-07-16 RX ADMIN — BUPIVACAINE HYDROCHLORIDE 25 MG: 5 INJECTION, SOLUTION EPIDURAL; INTRACAUDAL at 21:59

## 2019-07-16 RX ADMIN — LIDOCAINE HYDROCHLORIDE 10 ML: 10 INJECTION, SOLUTION EPIDURAL; INFILTRATION; INTRACAUDAL; PERINEURAL at 22:00

## 2019-07-16 RX ADMIN — KETOROLAC TROMETHAMINE 30 MG: 30 INJECTION, SOLUTION INTRAMUSCULAR at 21:46

## 2019-07-16 RX ADMIN — PREDNISONE 60 MG: 20 TABLET ORAL at 21:45

## 2019-07-16 RX ADMIN — HYDROMORPHONE HYDROCHLORIDE 1 MG: 1 INJECTION, SOLUTION INTRAMUSCULAR; INTRAVENOUS; SUBCUTANEOUS at 21:48

## 2019-07-16 NOTE — ED AVS SNAPSHOT
[facility] Emergency Department  [address] [place] [address]  Phone:  [phone]  Fax:  [phone]                                    Cristian aPnda   MRN: [id number]    Department:  [facility] Emergency Department   Date of Visit:  7/16/2019           After Visit Summary Signature Page    I have received my discharge instructions, and my questions have been answered. I have discussed any challenges I see with this plan with the nurse or doctor.    ..........................................................................................................................................  Patient/Patient Representative Signature      ..........................................................................................................................................  Patient Representative Print Name and Relationship to Patient    ..................................................               ................................................  Date                                   Time    ..........................................................................................................................................  Reviewed by Signature/Title    ...................................................              ..............................................  Date                                               Time          22EPIC Rev 08/18
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
## 2019-07-17 NOTE — ED TRIAGE NOTES
Patient with L lower back spasms, has been seen last week. Reports issues with insurance issues with getting her prescribed medications.

## 2019-07-17 NOTE — DISCHARGE INSTRUCTIONS
1.  Please do the exercises Dr. Ferrara had you start doing yesterday.    2.  Please make an appointment with the doctor that you are restricted to to get on your steroids and muscle relaxants that Dr. Ferrara prescribed for you.  I would call first thing in the morning.

## 2019-07-18 ENCOUNTER — TELEPHONE (OUTPATIENT)
Dept: ORTHOPEDICS | Facility: OTHER | Age: 39
End: 2019-07-18

## 2019-07-18 NOTE — TELEPHONE ENCOUNTER
Reason for Call:  Other prescription    Detailed comments: The pharmacy is not letting her fill her RX because she can only have one person prescribing meds. She is wondering how she can get her medication.    Phone Number Patient can be reached at: Mobile [phone]    Best Time: any    Can we leave a detailed message on this number? YES    Call taken on 7/18/2019 at 3:47 PM by Miracle Grover

## 2019-07-18 NOTE — TELEPHONE ENCOUNTER
"Spoke with the patient who states that the pharmacy informed her that she is unable to fill the medication prescribed by Dr. Ferrara due to being restricted to one prescribing physician (Dr. Oropeza) for all medication.     I spoke with the pharmacy and the prescribing program through [employer] and this is the information I received:    Dr. Oropeza was most likely selected randomly by [employer] as letters are sent to the patient to pick a PCP and most likely none was selected so a physician was assigned.     In reference to the current medications, the patient will either have to be seen by Dr. Oropeza and have her prescribe the Medrol dose pack and Flexeril or Dr. Oropeza will have to place a referral to allow for a 1 time exception for Dr. Ferrara to prescribe. I have had the insurance company fax a referral form to Dr. Oropeza in case she would like to allow for Dr. Ferrara to prescribe.       The insurance company said that she is \"locked in\" to seeing Dr. Oropeza until 9/13. It seemed that at that time, the patient would have to get a referral from Dr. Oropeza to change providers.    I spoke with the patient and explained this information to her. She expressed understanding. I encouraged her to call Dr. Oropeza to schedule an appointment and establish care as she will need to see her for all prescription.     I informed her that I would message Dr. Oropeza and let her know the situation. I will contact the patient know     Number for physician prescribing program: [phone]    Yolanda Delgado M.Ed., LAT, ATC  Clinic Coordinator for Dr. Nicole Ferrara    "

## 2019-07-19 ENCOUNTER — HOSPITAL ENCOUNTER (OUTPATIENT)
Dept: MRI IMAGING | Facility: CLINIC | Age: 39
Discharge: HOME | End: 2019-07-19
Attending: PHYSICAL MEDICINE & REHABILITATION | Admitting: PHYSICAL MEDICINE & REHABILITATION
Payer: COMMERCIAL

## 2019-07-19 DIAGNOSIS — M54.42 CHRONIC LEFT-SIDED LOW BACK PAIN WITH LEFT-SIDED SCIATICA: ICD-10-CM

## 2019-07-19 DIAGNOSIS — G89.29 CHRONIC LEFT-SIDED LOW BACK PAIN WITH LEFT-SIDED SCIATICA: ICD-10-CM

## 2019-07-19 PROCEDURE — 72148 MRI LUMBAR SPINE W/O DYE: CPT

## 2019-07-20 ENCOUNTER — TELEPHONE (OUTPATIENT)
Dept: ORTHOPEDICS | Facility: OTHER | Age: 39
End: 2019-07-20

## 2019-07-20 DIAGNOSIS — G89.29 CHRONIC LEFT-SIDED LOW BACK PAIN WITH LEFT-SIDED SCIATICA: Primary | ICD-10-CM

## 2019-07-20 DIAGNOSIS — M54.42 CHRONIC LEFT-SIDED LOW BACK PAIN WITH LEFT-SIDED SCIATICA: Primary | ICD-10-CM

## 2019-07-20 NOTE — TELEPHONE ENCOUNTER
Spoke with the patient and reviewed the MRI results. She would like to try to get in at [place] for the REZA. She will plan to call on Monday to schedule.     Epidural order faxed to surgery scheduling today.     If she is unsatisfied with the wait time to get into [place] for the injection, she will call us back.    Yolanda Delgado M.Ed., LAT, ATC  Clinic Coordinator for Dr. Nicole Ferrara

## 2019-07-20 NOTE — TELEPHONE ENCOUNTER
----- Message from Elaina Ferrara MD sent at 7/20/2019  8:18 AM CDT -----  Yolanda,  Do you mind calling Cristian to let her know her MRI results? Specifically, she has a extruded disc which may be affecting the left L5 nerve root - I think this is the source of her symptoms.  Would recommend epidural steroid injection.    Thank you,  Nicole Ferrara MD

## 2019-07-22 ENCOUNTER — HOSPITAL ENCOUNTER (OUTPATIENT)
Facility: CLINIC | Age: 39
End: 2019-07-22
Attending: ANESTHESIOLOGY | Admitting: ANESTHESIOLOGY
Payer: COMMERCIAL

## 2019-07-22 ENCOUNTER — TELEPHONE (OUTPATIENT)
Dept: SURGERY | Facility: CLINIC | Age: 39
End: 2019-07-22

## 2019-07-22 NOTE — TELEPHONE ENCOUNTER
Contacted patient to schedule REZA  Date: 8/8/19  Time: 400pm  Dr. Simon    Instructed pt to have H&P and  for procedure.

## 2019-07-22 NOTE — TELEPHONE ENCOUNTER
Patient is unable to get into [place] until Aug 8th. She would like to be seen sooner. Can you send in a referral for [facility]?

## 2019-07-24 ENCOUNTER — TELEPHONE (OUTPATIENT)
Dept: FAMILY MEDICINE | Facility: CLINIC | Age: 39
End: 2019-07-24

## 2019-07-24 NOTE — TELEPHONE ENCOUNTER
I received a letter in the mail stating that I am this patient's sole provider on a restricted program.  I have never seen this patient, and have no upcoming appointments.  Please see letter I wrote.  Please fax this back to her insurance program notifying them that I will be unable to fulfill the role as a sole practitioner for restricted patient such as this.  Sobia Oropeza MD

## 2019-07-25 NOTE — TELEPHONE ENCOUNTER
Letter has been faxed to the Insurance company.     [facility]- Fax number [phone]  Phone Number [phone]    Valerie Torres CMA ([facility])

## 2019-07-25 NOTE — TELEPHONE ENCOUNTER
I will not prescribe or approve medications for a patient I have not seen. I have already written a letter to her insurance company stating that they cannot require me to be her restricted physician when I have never seen this patient and have no scheduled appointments with her. She will not be able to see me in a timely fashion and I am not able to provide 24/7 care to co-sign every visit or every referral to see other providers   Sobia Oropeza MD

## 2019-07-25 NOTE — PROGRESS NOTES
Cristian Panda is a 37 year old female who is seen in consultation at the request of .  History of Present illness:  Cristian presents for evaluation of:  1.) rt knee  Onset: 3 days  Symptoms brought on by: nothing.   Character:  swelling and sore.    Progression of symptoms:  worse.    Previous similar pain: no . Surgery in 1993  Pain Level:  6/10.   Previous treatments:  ice, support wrap and Ibuprofen.  Currently on Blood thinners? No  Diagnosis of Diabetes? No           Nephrology Progress Note  Taylor Gold  Date of Admission : 7/18/2019    CC:  Follow up for malignant HTN       Assessment and Plan     Malignant renovascular HTN   - CTA : Moderate to severe MARY involving dominant R renal artery and accessory Left Renal artery   - S/P Right main renal artery angioplasty - 7/24  - increased coreg   - start Nifedipine   - avoid RAAS blockers due to CASSY   - wean off labetalol gtt    CASSY :  - presumed contrast nephropathy   - Not suspecting atheroemboli post angioplasty   - IVF : NS 1L total today     L forearm cellulitis   - 2/2 IV infiltration   - watch for now     Mild CKD  - baseline Cr 1.1-1.2 mg /dl   - 2/2 Hypertensive nephropathy      Dyslipidemia        Interval History:  Seen and examined. spasm like pain in R flank resolved. Off Nitro gtt due to HA and on labetalol gtt overnigh    No cp, sob, n/v/d. Current Medications: all current  Medications have been eviewed in EPIC  Review of Systems: Pertinent items are noted in HPI. Objective:  Vitals:    Vitals:    07/24/19 2315 07/25/19 0311 07/25/19 0733 07/25/19 1130   BP: 130/73 159/69 131/61 138/71   Pulse: 68 70 75 65   Resp: 16 18 18 18   Temp: 98.1 °F (36.7 °C) 98 °F (36.7 °C) 99.5 °F (37.5 °C) 98.8 °F (37.1 °C)   SpO2: 98% 98% 96% 98%   Weight:       Height:         Intake and Output:  No intake/output data recorded.   07/23 1901 - 07/25 0700  In: 678.5 [P.O.:480; I.V.:198.5]  Out: 450 [Urine:450]    Physical Examination:  General: NAD,Conversant   Neck:  Supple, no mass  Resp:  Lungs CTA B/L, no wheezing , normal respiratory effort  CV:  RRR,  no murmur or rub, no LE edema  GI:  Soft, NT, + Bowel sounds, no hepatosplenomegaly  Neurologic:  Non focal  Psych:             AAO x 3 appropriate affect   Skin:  No Rash  :  No beckham    []    High complexity decision making was performed  []    Patient is at high-risk of decompensation with multiple organ involvement    Lab Data Personally Reviewed: I have reviewed all the pertinent labs, microbiology data and radiology studies during assessment. Recent Labs     07/25/19 0242 07/24/19 0341 07/23/19  0011    140 137   K 3.9 3.9 3.9    110* 108   CO2 20* 21 21   * 129* 112*   BUN 21* 22* 21*   CREA 1.62* 1.07* 1.04*   CA 9.0 9.1 9.0   ALB  --   --  3.6   SGOT  --   --  40*   ALT  --   --  48     Recent Labs     07/25/19 0242 07/24/19 0341 07/23/19  0011   WBC 4.4 4.2 4.3   HGB 9.3* 9.4* 9.4*   HCT 28.3* 28.6* 29.3*   * 119* 117*     No results found for: SDES  No results found for: CULT  Recent Results (from the past 24 hour(s))   CBC WITH AUTOMATED DIFF    Collection Time: 07/25/19  2:42 AM   Result Value Ref Range    WBC 4.4 3.6 - 11.0 K/uL    RBC 2.70 (L) 3.80 - 5.20 M/uL    HGB 9.3 (L) 11.5 - 16.0 g/dL    HCT 28.3 (L) 35.0 - 47.0 %    .8 (H) 80.0 - 99.0 FL    MCH 34.4 (H) 26.0 - 34.0 PG    MCHC 32.9 30.0 - 36.5 g/dL    RDW 12.3 11.5 - 14.5 %    PLATELET 888 (L) 690 - 400 K/uL    MPV 9.2 8.9 - 12.9 FL    NRBC 0.0 0  WBC    ABSOLUTE NRBC 0.00 0.00 - 0.01 K/uL    NEUTROPHILS 65 32 - 75 %    LYMPHOCYTES 24 12 - 49 %    MONOCYTES 8 5 - 13 %    EOSINOPHILS 3 0 - 7 %    BASOPHILS 0 0 - 1 %    IMMATURE GRANULOCYTES 0 0.0 - 0.5 %    ABS. NEUTROPHILS 2.8 1.8 - 8.0 K/UL    ABS. LYMPHOCYTES 1.1 0.8 - 3.5 K/UL    ABS. MONOCYTES 0.4 0.0 - 1.0 K/UL    ABS. EOSINOPHILS 0.1 0.0 - 0.4 K/UL    ABS. BASOPHILS 0.0 0.0 - 0.1 K/UL    ABS. IMM.  GRANS. 0.0 0.00 - 0.04 K/UL    DF AUTOMATED     METABOLIC PANEL, BASIC    Collection Time: 07/25/19  2:42 AM   Result Value Ref Range    Sodium 137 136 - 145 mmol/L    Potassium 3.9 3.5 - 5.1 mmol/L    Chloride 108 97 - 108 mmol/L    CO2 20 (L) 21 - 32 mmol/L    Anion gap 9 5 - 15 mmol/L    Glucose 118 (H) 65 - 100 mg/dL    BUN 21 (H) 6 - 20 MG/DL    Creatinine 1.62 (H) 0.55 - 1.02 MG/DL    BUN/Creatinine ratio 13 12 - 20      GFR est AA 38 (L) >60 ml/min/1.73m2    GFR est non-AA 31 (L) >60 ml/min/1.73m2    Calcium 9.0 8.5 - 10.1 MG/DL                   CHIRAG [address]   [address] [address]  Phone - [phone]   Fax - [phone]  [email or web address]

## 2019-07-30 NOTE — TELEPHONE ENCOUNTER
Rogelio calls, states she received letter and is not sure how to interpret it. If this is a letter of dismissal, Rogelio stated that Dr. Oropeza needs to follow the dismissal rules of the [employer] recipient manual. The letter is not valid for dismissal. Rogelio stated that patient can see other providers in the clinic if Sandip is unavailable or out of office. Patient would just need to stay at primary clinic. If another provider approves a prescription for patient, there a couple things that can be done. 1. You would need to call [employer] and give a verbal. 2. Place a referral for another provider to write prescriptions and send to [employer] to add to her insurance account.

## 2019-08-06 NOTE — TELEPHONE ENCOUNTER
No this is not a letter of dismissal.  I just intended it to be a notice to the insurance company that since I have never seen this patient, I am uncomfortable approving meds/Rx's and writing referrals as a sole restricted provider. She will be at a disadvantage to get the care she needs if I have to approve everything that she needs, since I have never seen her and I work as part of a group practice.  Will need to request that her insurance company allows anyone in the [facility] to serve that role, not just one provider.   Will forward to clinic administration to see if we can work with insurance to widen this restriction to the entire clinic and hospital.   Sobia Oropeza MD

## 2019-08-07 NOTE — TELEPHONE ENCOUNTER
Contacted insurance company yesterday to discuss.  They provided me with two options:     1) Dr. Oropeza could put referrals in so Cristian could see her partners or   2) We could recommend another provider for the patient to see in the clinic and they would update it and communicate the information with the patient.    Thank You,      Nasra Mon  Patient Information Supervisor  [facility], and [facility]  [phone]

## 2019-08-07 NOTE — TELEPHONE ENCOUNTER
Information from insurance was relayed to Dr. Oropeza.      I contacted the patient to offer to set up an appointment for a primary care visit so that she could establish care in our practice.  I did inform her that Dr. Oropeza was booking out until November.  Cristian has a preference to see a female provider at the [place] location and preferred to see one of Dr. Oropeza's partners so she could get in for an appointment sooner.       Appointment was scheduled with King Sotomayor for Tuesday, September 10th at 4pm.  I informed her that I would speak with Dr. Oropeza about putting in a referral for her to see King for this appointment and if she felt that King would be a good fit for her care we could talk to the insurance company about changing her restricted provider.     Patient was very appreciative of the phone call and very pleasant to speak with.      Khanh (Referrals) - Dr. Oropeza has verbally given me permission for referral for Cristian to see a partner in the clinic, can you please assist with getting this to [employer] Restricted Program?    Thank You,      Nasra Mon  Patient Information Supervisor  [facility], and [facility]  [phone]

## 2019-08-07 NOTE — TELEPHONE ENCOUNTER
Please notify her that King is a nurse practitioner and she needs to be certain that King will accept her as a primary provider before choosing her for a restricted program.  King also works as part of our same group practice. If King is able to address her care needs then I will approve the switch to King as her restricted provider if King agrees.   Sobia Oropeza MD

## 2019-08-20 NOTE — TELEPHONE ENCOUNTER
I have been trying to reach Adriana from the [employer] Restricted Recipient Program to straighten this out.  I left another message this morning in hopes that she will contact me today.

## 2019-08-27 NOTE — TELEPHONE ENCOUNTER
Adriana from the restricted recipient program left a voice mail stating that she received the paperwork I sent in and  that Cristian is now restricted to King Sotomayor.

## 2021-09-23 ENCOUNTER — OFFICE VISIT (OUTPATIENT)
Dept: FAMILY MEDICINE | Facility: CLINIC | Age: 41
End: 2021-09-23
Payer: COMMERCIAL

## 2021-09-23 VITALS
HEART RATE: 68 BPM | RESPIRATION RATE: 20 BRPM | SYSTOLIC BLOOD PRESSURE: 118 MMHG | DIASTOLIC BLOOD PRESSURE: 78 MMHG | BODY MASS INDEX: 23.4 KG/M2 | TEMPERATURE: 98 F | HEIGHT: 60 IN | WEIGHT: 119.2 LBS | OXYGEN SATURATION: 100 %

## 2021-09-23 DIAGNOSIS — Z13.1 SCREENING FOR DIABETES MELLITUS: ICD-10-CM

## 2021-09-23 DIAGNOSIS — Z23 HIGH PRIORITY FOR 2019-NCOV VACCINE: ICD-10-CM

## 2021-09-23 DIAGNOSIS — Z30.433 ENCOUNTER FOR REMOVAL AND REINSERTION OF INTRAUTERINE CONTRACEPTIVE DEVICE: ICD-10-CM

## 2021-09-23 DIAGNOSIS — Z12.4 SCREENING FOR MALIGNANT NEOPLASM OF CERVIX: ICD-10-CM

## 2021-09-23 DIAGNOSIS — Z13.29 SCREENING FOR THYROID DISORDER: ICD-10-CM

## 2021-09-23 DIAGNOSIS — N95.1 MENOPAUSAL SYNDROME (HOT FLASHES): ICD-10-CM

## 2021-09-23 DIAGNOSIS — Z00.00 ANNUAL PHYSICAL EXAM: Primary | ICD-10-CM

## 2021-09-23 LAB
ANION GAP SERPL CALCULATED.3IONS-SCNC: 5 MMOL/L (ref 3–14)
BUN SERPL-MCNC: 15 MG/DL (ref 7–30)
CALCIUM SERPL-MCNC: 8.7 MG/DL (ref 8.5–10.1)
CHLORIDE BLD-SCNC: 105 MMOL/L (ref 94–109)
CO2 SERPL-SCNC: 27 MMOL/L (ref 20–32)
CREAT SERPL-MCNC: 0.85 MG/DL (ref 0.52–1.04)
ERYTHROCYTE [DISTWIDTH] IN BLOOD BY AUTOMATED COUNT: 11.9 % (ref 10–15)
FSH SERPL-ACNC: 118 IU/L
GFR SERPL CREATININE-BSD FRML MDRD: 85 ML/MIN/1.73M2
GLUCOSE BLD-MCNC: 104 MG/DL (ref 70–99)
HBA1C MFR BLD: 5 % (ref 0–5.6)
HCT VFR BLD AUTO: 36.1 % (ref 35–47)
HGB BLD-MCNC: 12.3 G/DL (ref 11.7–15.7)
MCH RBC QN AUTO: 28.5 PG (ref 26.5–33)
MCHC RBC AUTO-ENTMCNC: 34.1 G/DL (ref 31.5–36.5)
MCV RBC AUTO: 84 FL (ref 78–100)
PLATELET # BLD AUTO: 301 10E3/UL (ref 150–450)
POTASSIUM BLD-SCNC: 3.8 MMOL/L (ref 3.4–5.3)
RBC # BLD AUTO: 4.32 10E6/UL (ref 3.8–5.2)
SODIUM SERPL-SCNC: 137 MMOL/L (ref 133–144)
TSH SERPL DL<=0.005 MIU/L-ACNC: 1.85 MU/L (ref 0.4–4)
WBC # BLD AUTO: 8.6 10E3/UL (ref 4–11)

## 2021-09-23 PROCEDURE — 87624 HPV HI-RISK TYP POOLED RSLT: CPT | Performed by: PHYSICIAN ASSISTANT

## 2021-09-23 PROCEDURE — G0145 SCR C/V CYTO,THINLAYER,RESCR: HCPCS | Performed by: PHYSICIAN ASSISTANT

## 2021-09-23 PROCEDURE — 58300 INSERT INTRAUTERINE DEVICE: CPT | Performed by: PHYSICIAN ASSISTANT

## 2021-09-23 PROCEDURE — 91301 COVID-19,PF,MODERNA (18+ YRS): CPT | Performed by: PHYSICIAN ASSISTANT

## 2021-09-23 PROCEDURE — 84443 ASSAY THYROID STIM HORMONE: CPT | Performed by: PHYSICIAN ASSISTANT

## 2021-09-23 PROCEDURE — 83001 ASSAY OF GONADOTROPIN (FSH): CPT | Performed by: PHYSICIAN ASSISTANT

## 2021-09-23 PROCEDURE — 85027 COMPLETE CBC AUTOMATED: CPT | Performed by: PHYSICIAN ASSISTANT

## 2021-09-23 PROCEDURE — 58301 REMOVE INTRAUTERINE DEVICE: CPT | Performed by: PHYSICIAN ASSISTANT

## 2021-09-23 PROCEDURE — 36415 COLL VENOUS BLD VENIPUNCTURE: CPT | Performed by: PHYSICIAN ASSISTANT

## 2021-09-23 PROCEDURE — 80048 BASIC METABOLIC PNL TOTAL CA: CPT | Performed by: PHYSICIAN ASSISTANT

## 2021-09-23 PROCEDURE — 83036 HEMOGLOBIN GLYCOSYLATED A1C: CPT | Performed by: PHYSICIAN ASSISTANT

## 2021-09-23 PROCEDURE — 0011A COVID-19,PF,MODERNA (18+ YRS): CPT | Performed by: PHYSICIAN ASSISTANT

## 2021-09-23 PROCEDURE — 99396 PREV VISIT EST AGE 40-64: CPT | Mod: 25 | Performed by: PHYSICIAN ASSISTANT

## 2021-09-23 ASSESSMENT — PAIN SCALES - GENERAL: PAINLEVEL: NO PAIN (0)

## 2021-09-23 ASSESSMENT — MIFFLIN-ST. JEOR: SCORE: 1118.22

## 2021-09-23 NOTE — PROGRESS NOTES
SUBJECTIVE:    Is a pregnancy test required: No.  Was a consent obtained?  Yes    Subjective: Cristian Panda is a 40 year old  presents for IUD and desires Mirena type IUD.  She requests removal of the IUD because the IUD effectiveness has     Patient has been given the opportunity to ask questions about all forms of birth control, including all options appropriate for Cristian Panda. Discussed that no method of birth control, except abstinence is 100% effective against pregnancy or sexually transmitted infection.     Cristian Panda understands she may have the IUD removed at any time. IUD should be removed by a health care provider and the current IUD will be removed today.    The entire removal and insertion procedure was reviewed with the patient, including care after placement.    Today's PHQ-2 Score:   PHQ-2 (  [employer]) 2021   Q1: Little interest or pleasure in doing things 0   Q2: Feeling down, depressed or hopeless 0   PHQ-2 Score 0       PROCEDURE:    Premedicated with ibuprofen.  A speculum exam was performed and the cervix was visualized. The IUD string was visualized. Using ring forceps, the string  was grasped and the IUD removed intact.    Under sterile technique, cervix was visualized with speculum and prepped with Betadine solution swab x 3. Tenaculum was placed for stability. The uterus was gently straightened and sounded to 6.5 cm. IUD prepared for placement, and IUD inserted according to 's instructions without difficulty or significant ressitance, and deployed at the fundus. The strings were visualized and trimmed to 3.0 cm from the external os. Tenaculum was removed and hemostasis noted. Speculum removed.  Patient tolerated procedure well.    Lot # [id number]  Exp: 2024    EBL: minimal    Complications: none      POST PROCEDURE:    Given 's handouts, including when to have IUD removed, list of danger s/sx, side effects and follow up  recommended. Encouraged condom use for prevention of STD. Advised to call for any fever, for prolonged or severe pain or bleeding, abnormal vaginal dischage, or unable to palpate strings. She was advised to use pain medications (ibuprofen) as needed for mild to moderate pain. Advised to follow-up in clinic in 4-6 weeks for IUD string check if unable to find strings or as directed by provider.     Hallie Green PA-C
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
## 2021-09-23 NOTE — PROGRESS NOTES
"    {PROVIDER CHARTING PREFERENCE:462544}    Subjective   Cristian is a 40 year old who presents for the following health issues {ACCOMPANIED BY STATEMENT (Optional):389089}    HPI     Concern - remove and replace Mirena  Onset:   Description: remove and replace Mirena  Intensity: 0/10  Progression of Symptoms:    Accompanying Signs & Symptoms:   Previous history of similar problem:   Precipitating factors:        Worsened by:   Alleviating factors:        Improved by:   Therapies tried and outcome: Mirena    {additonal problems for provider to add (Optional):446954}    Review of Systems   {ROS COMP (Optional):760749}      Objective    /78 (BP Location: Left arm, Patient Position: Chair, Cuff Size: Adult Regular)   Pulse 68   Temp 98  F (36.7  C) (Temporal)   Resp 20   Ht 1.518 m (4' 11.75\")   Wt 54.1 kg (119 lb 3.2 oz)   SpO2 100%   BMI 23.47 kg/m    Body mass index is 23.47 kg/m .  Physical Exam   {Exam List (Optional):151398}    {Diagnostic Test Results (Optional):239393}    {AMBULATORY ATTESTATION (Optional):454869}        "

## 2021-09-23 NOTE — NURSING NOTE
Health Maintenance Due   Topic Date Due     ANNUAL REVIEW OF HM ORDERS  Never done     ADVANCE CARE PLANNING  Never done     Pneumococcal Vaccine: Pediatrics (0 to 5 Years) and At-Risk Patients (6 to 64 Years) (1 of 2 - PPSV23) Never done     COVID-19 Vaccine (1) Never done     HEPATITIS C SCREENING  Never done     PREVENTIVE CARE VISIT  09/25/2016     DTAP/TDAP/TD IMMUNIZATION (8 - Td or Tdap) 12/11/2017     HPV TEST  09/25/2018     PAP  09/25/2018     URINE DRUG SCREEN  07/10/2020     INFLUENZA VACCINE (1) 09/01/2021     Raissa GO LPN

## 2021-09-24 ENCOUNTER — TELEPHONE (OUTPATIENT)
Dept: FAMILY MEDICINE | Facility: CLINIC | Age: 41
End: 2021-09-24

## 2021-09-24 NOTE — TELEPHONE ENCOUNTER
I called this patient with the following per Norma Sterling PA-C: Please notify patient /parent that her labs were all normal. Her FSH level was elevated consistent with going through menopause. Please have her let me know if symptoms are worsening.

## 2021-09-24 NOTE — TELEPHONE ENCOUNTER
----- Message from Hallie Green PA-C sent at 9/24/2021  6:43 AM CDT -----  Please notify patient /parent that her labs were all normal. Her FSH level was elevated consistent with going through menopause. Please have her let me know if symptoms are worsening.     Hallie Green PA-C

## 2021-09-24 NOTE — RESULT ENCOUNTER NOTE
Please notify patient /parent that her labs were all normal. Her FSH level was elevated consistent with going through menopause. Please have her let me know if symptoms are worsening.     Hallie Green PA-C

## 2021-09-27 LAB
BKR LAB AP GYN ADEQUACY: NORMAL
BKR LAB AP GYN INTERPRETATION: NORMAL
BKR LAB AP HPV REFLEX: NORMAL
BKR LAB AP PREVIOUS ABNORMAL: NORMAL
PATH REPORT.COMMENTS IMP SPEC: NORMAL
PATH REPORT.RELEVANT HX SPEC: NORMAL

## 2021-09-29 LAB
HUMAN PAPILLOMA VIRUS 16 DNA: NEGATIVE
HUMAN PAPILLOMA VIRUS 18 DNA: NEGATIVE
HUMAN PAPILLOMA VIRUS FINAL DIAGNOSIS: NORMAL
HUMAN PAPILLOMA VIRUS OTHER HR: NEGATIVE

## 2022-01-15 ENCOUNTER — HEALTH MAINTENANCE LETTER (OUTPATIENT)
Age: 42
End: 2022-01-15

## 2022-12-26 ENCOUNTER — HEALTH MAINTENANCE LETTER (OUTPATIENT)
Age: 42
End: 2022-12-26

## 2023-11-26 ENCOUNTER — HEALTH MAINTENANCE LETTER (OUTPATIENT)
Age: 43
End: 2023-11-26

## 2024-02-04 ENCOUNTER — HEALTH MAINTENANCE LETTER (OUTPATIENT)
Age: 44
End: 2024-02-04

## 2025-01-14 ENCOUNTER — OFFICE VISIT (OUTPATIENT)
Dept: FAMILY MEDICINE | Facility: OTHER | Age: 45
End: 2025-01-14
Payer: COMMERCIAL

## 2025-01-14 VITALS
TEMPERATURE: 98.5 F | RESPIRATION RATE: 20 BRPM | WEIGHT: 111 LBS | BODY MASS INDEX: 22.38 KG/M2 | OXYGEN SATURATION: 99 % | DIASTOLIC BLOOD PRESSURE: 70 MMHG | HEIGHT: 59 IN | SYSTOLIC BLOOD PRESSURE: 110 MMHG | HEART RATE: 108 BPM

## 2025-01-14 DIAGNOSIS — Z12.4 SCREENING FOR MALIGNANT NEOPLASM OF CERVIX: ICD-10-CM

## 2025-01-14 DIAGNOSIS — F43.21 GRIEF REACTION: ICD-10-CM

## 2025-01-14 DIAGNOSIS — N81.10 VAGINAL PROLAPSE: ICD-10-CM

## 2025-01-14 DIAGNOSIS — Z30.432 ENCOUNTER FOR REMOVAL OF INTRAUTERINE CONTRACEPTIVE DEVICE: Primary | ICD-10-CM

## 2025-01-14 ASSESSMENT — PATIENT HEALTH QUESTIONNAIRE - PHQ9
10. IF YOU CHECKED OFF ANY PROBLEMS, HOW DIFFICULT HAVE THESE PROBLEMS MADE IT FOR YOU TO DO YOUR WORK, TAKE CARE OF THINGS AT HOME, OR GET ALONG WITH OTHER PEOPLE: SOMEWHAT DIFFICULT
SUM OF ALL RESPONSES TO PHQ QUESTIONS 1-9: 14
SUM OF ALL RESPONSES TO PHQ QUESTIONS 1-9: 14

## 2025-01-14 NOTE — PROGRESS NOTES
IUD Removal:  SUBJECTIVE:    Is a pregnancy test required: No.  Was a consent obtained?  Yes    Cristian Panda is a 44 year old female,, No LMP recorded. who presents today for IUD removal. Her current IUD was placed 4 years ago. She has not had problems with the IUD. She requests removal of the IUD because  her FSH 3 years ago was elevated and indicates post menopause and she would like to have the IUD out    She also states that she notices that she feels a bulging in her vaginal area and that it does not look quite right.  She also finds it uncomfortable with intercourse.  She denies any urinary incontinence.  She does notice increased urinary frequency and nocturia a few times a night.  She states that she is always had insomnia and    Feels that she falls asleep easily but then she wakes up after about 4 hours and has trouble going back to sleep.  She does admit to some anxiety.  She also admits to some depression.  She states her aunt who is only a few years older than her committed suicide a few months ago and she is really having difficulty coping with this.  Patient denies being diagnosed with anxiety or depression in the past and denies being on medication for it in the past.  She is very leery of taking any medications for mental health as she feels her aunt did not do well on a medication which ended up leading to suicide.  Patient states she would never actually commit suicide but does not like having those thoughts that are fleeting in her brain.    Today's PHQ-2 Score:       2025     3:12 PM   PHQ-2 (  [employer])   Q1: Little interest or pleasure in doing things 2   Q2: Feeling down, depressed or hopeless 2   PHQ-2 Score 4    Q1: Little interest or pleasure in doing things More than half the days   Q2: Feeling down, depressed or hopeless More than half the days   PHQ-2 Score 4       Patient-reported       PROCEDURE:    A speculum exam was performed and the cervix was visualized.  Pap  was taken.  The IUD string was visualized. Using ring forceps, the string  was grasped and the IUD removed intact.    POST PROCEDURE:    The patient tolerated the procedure well. Patient was discharged in stable condition.    Call if bleeding, pain or fever occur.    Patient does have mild vaginal prolapse noted.  We discussed referral to urology or gynecology.  As patient is not having incontinence and she is complaining of dyspareunia we elected to refer her to gynecology first but did discuss that they may want her to see urology.      We also discussed patient's mood.  She is definitely grieving and also has longstanding insomnia.  Over-the-counter sleeping medications have not been helpful.  She is not interested in prescription medication at this time.  We discussed counseling and patient is agreeable to that and a mental health referral was placed.  We did discuss that if she is feeling suicidal or is not feeling safe with herself that she should go to the emergency department.        Karolina Sanchez MD  Answers submitted by the patient for this visit:  Patient Health Questionnaire (Submitted on 1/14/2025)  If you checked off any problems, how difficult have these problems made it for you to do your work, take care of things at home, or get along with other people?: Somewhat difficult  PHQ9 TOTAL SCORE: 14

## 2025-01-15 LAB
HPV HR 12 DNA CVX QL NAA+PROBE: NEGATIVE
HPV16 DNA CVX QL NAA+PROBE: NEGATIVE
HPV18 DNA CVX QL NAA+PROBE: NEGATIVE
HUMAN PAPILLOMA VIRUS FINAL DIAGNOSIS: NORMAL

## 2025-02-27 ENCOUNTER — OFFICE VISIT (OUTPATIENT)
Dept: OBGYN | Facility: OTHER | Age: 45
End: 2025-02-27
Attending: FAMILY MEDICINE
Payer: COMMERCIAL

## 2025-02-27 VITALS
DIASTOLIC BLOOD PRESSURE: 61 MMHG | HEIGHT: 59 IN | BODY MASS INDEX: 22.46 KG/M2 | SYSTOLIC BLOOD PRESSURE: 107 MMHG | WEIGHT: 111.4 LBS

## 2025-02-27 DIAGNOSIS — R39.15 URINARY URGENCY: Primary | ICD-10-CM

## 2025-02-27 DIAGNOSIS — N95.8 GENITOURINARY SYNDROME OF MENOPAUSE: ICD-10-CM

## 2025-02-27 DIAGNOSIS — N94.10 DYSPAREUNIA, FEMALE: ICD-10-CM

## 2025-02-27 DIAGNOSIS — N81.10 VAGINAL PROLAPSE: ICD-10-CM

## 2025-02-27 RX ORDER — ESTRADIOL 0.1 MG/G
2 CREAM VAGINAL
Qty: 42.5 G | Refills: 3 | Status: SHIPPED | OUTPATIENT
Start: 2025-02-27
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
## 2025-02-27 NOTE — PATIENT INSTRUCTIONS
Low estrogen effect noted on today's exam as well as mild vaginal prolapse.  I recommend pelvic floor physical therapy to help with urinary symptoms, discomfort with intercourse, and prolapse symptoms.  Please follow up if your symptoms do not improve with physical therapy and vaginal estrogen cream.     Also follow up if you would like to discuss menopause management in more detail.     You are considered menopausal when you have gone one year without a period.  This is difficult to determine when you have had an IUD, however.  You may use condoms for another year to reduce the risk of pregnancy. Please let me know if you have any bleeding or spotting going forward.      [email or web address]    [email or web address]    Menopause manifesto by Dr. Sunita Servin                If you have labs or imaging done, the results will automatically release in [facility] without an interpretation.  Your health care professional will review those results and send an interpretation with recommendations as soon as possible, but this may be 1-3 business days.    If you have any questions regarding your visit, please contact your care team.     [employer] [facility]: [phone]  Women s Health CLINIC HOURS TELEPHONE NUMBER       MD Zuleyma Lovell - Certified Medical Assistant     [place]- LEAD RN  [place]-VINCE Ledezma-VINCE Rangel-  Nadia-     Monday- [place]  8:00 a.m - 5:00 p.m    Tuesday- Surgery        Thursday- [place]  8:00 a.m - 5:00 p.m.    Friday- [place]  7:30 a.m - 4:00 p.m. [facility]  [address]  [place] [address]  [phone] Fax  [phone] Phone  Imaging Scheduling [phone]    [facility]  [address] [place] [address]  [phone]    [facility]  [address] [place] [address]  [phone] Phone  [phone] Fax  Imaging Scheduling [phone]      Urgent Care locations:  [facility] Monday-Friday  10 am - 8 pm  Saturday and Sunday   9 am - 5 pm  Monday-Friday   10 am - 8 pm  Saturday and Sunday   9 am - 5 pm   [phone]     **Surgeries** Our Surgery Schedulers will contact you to schedule. If you do not receive a call within 3 business days, please call [phone].    If you need a medication refill, please contact your pharmacy. Please allow 3 business days for your refill to be completed.    As always, thank you for trusting us with your healthcare needs!

## 2025-03-02 ENCOUNTER — HEALTH MAINTENANCE LETTER (OUTPATIENT)
Age: 45
End: 2025-03-02

## 2025-04-07 ENCOUNTER — THERAPY VISIT (OUTPATIENT)
Dept: PHYSICAL THERAPY | Facility: CLINIC | Age: 45
End: 2025-04-07
Attending: OBSTETRICS & GYNECOLOGY
Payer: COMMERCIAL

## 2025-04-07 DIAGNOSIS — N81.10 VAGINAL PROLAPSE: ICD-10-CM

## 2025-04-07 DIAGNOSIS — R39.15 URINARY URGENCY: ICD-10-CM

## 2025-04-07 DIAGNOSIS — N94.10 DYSPAREUNIA, FEMALE: ICD-10-CM

## 2025-04-07 PROCEDURE — 97110 THERAPEUTIC EXERCISES: CPT | Mod: GP | Performed by: PHYSICAL THERAPIST

## 2025-04-07 PROCEDURE — 90912 BFB TRAINING 1ST 15 MIN: CPT | Mod: GP | Performed by: PHYSICAL THERAPIST

## 2025-04-07 PROCEDURE — 97162 PT EVAL MOD COMPLEX 30 MIN: CPT | Mod: GP | Performed by: PHYSICAL THERAPIST
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
## 2025-05-07 ENCOUNTER — THERAPY VISIT (OUTPATIENT)
Dept: PHYSICAL THERAPY | Facility: CLINIC | Age: 45
End: 2025-05-07
Attending: OBSTETRICS & GYNECOLOGY
Payer: COMMERCIAL

## 2025-05-07 DIAGNOSIS — N81.10 VAGINAL PROLAPSE: ICD-10-CM

## 2025-05-07 DIAGNOSIS — R39.15 URINARY URGENCY: ICD-10-CM

## 2025-05-07 DIAGNOSIS — N94.10 DYSPAREUNIA, FEMALE: Primary | ICD-10-CM

## 2025-05-07 PROCEDURE — 90912 BFB TRAINING 1ST 15 MIN: CPT | Mod: GP | Performed by: PHYSICAL THERAPIST

## 2025-05-07 PROCEDURE — 97110 THERAPEUTIC EXERCISES: CPT | Mod: GP | Performed by: PHYSICAL THERAPIST

## 2025-05-07 PROCEDURE — 97530 THERAPEUTIC ACTIVITIES: CPT | Mod: GP | Performed by: PHYSICAL THERAPIST

## 2025-05-07 PROCEDURE — 90913 BFB TRAINING EA ADDL 15 MIN: CPT | Mod: GP | Performed by: PHYSICAL THERAPIST

## 2025-05-14 ENCOUNTER — THERAPY VISIT (OUTPATIENT)
Dept: PHYSICAL THERAPY | Facility: CLINIC | Age: 45
End: 2025-05-14
Attending: OBSTETRICS & GYNECOLOGY
Payer: COMMERCIAL

## 2025-05-14 DIAGNOSIS — N81.10 VAGINAL PROLAPSE: ICD-10-CM

## 2025-05-14 DIAGNOSIS — N94.10 DYSPAREUNIA, FEMALE: Primary | ICD-10-CM

## 2025-05-14 DIAGNOSIS — R39.15 URINARY URGENCY: ICD-10-CM

## 2025-05-14 PROCEDURE — 97110 THERAPEUTIC EXERCISES: CPT | Mod: GP | Performed by: PHYSICAL THERAPIST

## 2025-05-21 ENCOUNTER — THERAPY VISIT (OUTPATIENT)
Dept: PHYSICAL THERAPY | Facility: CLINIC | Age: 45
End: 2025-05-21
Attending: OBSTETRICS & GYNECOLOGY
Payer: COMMERCIAL

## 2025-05-21 DIAGNOSIS — N81.10 VAGINAL PROLAPSE: ICD-10-CM

## 2025-05-21 DIAGNOSIS — R39.15 URINARY URGENCY: Primary | ICD-10-CM

## 2025-05-21 DIAGNOSIS — N94.10 DYSPAREUNIA, FEMALE: ICD-10-CM

## 2025-05-21 PROCEDURE — 97110 THERAPEUTIC EXERCISES: CPT | Mod: GP | Performed by: PHYSICAL THERAPIST

## 2025-06-11 ENCOUNTER — THERAPY VISIT (OUTPATIENT)
Dept: PHYSICAL THERAPY | Facility: CLINIC | Age: 45
End: 2025-06-11
Attending: OBSTETRICS & GYNECOLOGY
Payer: COMMERCIAL

## 2025-06-11 DIAGNOSIS — N94.10 DYSPAREUNIA, FEMALE: Primary | ICD-10-CM

## 2025-06-11 PROCEDURE — 97110 THERAPEUTIC EXERCISES: CPT | Mod: GP | Performed by: PHYSICAL THERAPIST
